# Patient Record
(demographics unavailable — no encounter records)

---

## 2024-11-06 NOTE — REASON FOR VISIT
[Follow-Up] : a follow-up visit [Abnormal CXR/ Chest CT] : an abnormal CXR/ chest CT [Pneumonia] : pneumonia [Cough] : cough

## 2024-11-06 NOTE — HISTORY OF PRESENT ILLNESS
[Never] : never [TextBox_4] :  Ms. Malissa Khalil is a 76 year old F here for evaluation of pneumonia, abnormal CT chest, and cough. She is accompanied by her daughter today.  Pt notes that over 20 yrs ago she was hospitalized for a "collapsed lung" and subsequently had an over 1-month hospital stay in which she was found to have a lot of new medical issues including diabetes and cardiac disease. She does not recall the treatment for her collapsed lung or which side it was on. She definitely knows she never required surgical procedure for it. Then in 2014 she had CABG done at [facility] and had uncomplicated post-op course. However, a few weeks after her surgery she was re-hospitalized at [facility] for persistent cardiac symptoms but does not recall any respiratory issues or complications. In 8/2023 Pt had episode of COVID and since then she's noticed a new daily dry cough. She also has had repeated bouts of pneumonia requiring hospitalization, most recently at [facility] 12/2023. She was advised by her PCP to seek Pulmonary evaluation.  Currently Pt notes daily dry cough and some mild dyspnea on exertion. She denies chest pain,  hemoptysis, fever, night sweats, poor appetite, or weight loss. She recently started iron supplements and noticed that made her appetite diminish slightly.  Never smoker Born in the [facility], immigrated to [place] in 1990[place] Previously worked in food services at the [employer]  4/2024 Ms. Khalil returns today for follow-up PFTs and repeat CT chest. She is again accompanied by her daughter. She feels well today but does note a 5-lb weight loss since last visit. She notes good appetite and eats well but she is very careful in her intake due to her diabetes and her GERD. She has been suffering from increased GERD symptoms lately.  6/2024 Ms. Khalil returns today for follow-up. She is accompanied by her daughter and other family members. She feels well today. Has good appetite; no cough, sputum production, hemoptysis, fevers, chills, night sweats. GERD symptoms improved. She recently had labs checked by her endocrinologist and her Na was 130. She was told to follow up with PCP.  11/2024 Ms. Khalil returns today for follow-up. She is accompanied by her daughter. She feels well; no significant change compared to last visit. She has occasional dry cough. Does not have fevers, chills, night sweats, hemoptysis, poor appetite, or weight loss. No recent illnesses or hospitalizations.

## 2024-11-06 NOTE — DISCUSSION/SUMMARY
[FreeTextEntry1] : 77F with DM, CAD s/p CABG, CKD here for follow up of bronchiectasis  #Abnormal CT chest #Bronchiectasis  - The CT report from [facility] 4/2024 was notable for resolved infiltrates from prior but new mucous plugging/acute inflammatory changes on R. No mention of bronchiectasis though I do see RLL bronchiectasis in 11/2023 imaging (uploaded to PACS). - Continue PPI for GERD manaement - Clinically no active signs of MAC - Lung exam with more squeaks compared to prior. Discussed daily nebulizer use to aide in secretion management; she is agreeable. Will start with once daily. Rx for albuterol & neb machine sent - Flu shot up to date